# Patient Record
Sex: FEMALE | Race: WHITE | NOT HISPANIC OR LATINO | Employment: FULL TIME | URBAN - METROPOLITAN AREA
[De-identification: names, ages, dates, MRNs, and addresses within clinical notes are randomized per-mention and may not be internally consistent; named-entity substitution may affect disease eponyms.]

---

## 2017-06-26 ENCOUNTER — OFFICE VISIT (OUTPATIENT)
Dept: FAMILY MEDICINE | Facility: CLINIC | Age: 22
End: 2017-06-26

## 2017-06-26 VITALS
HEART RATE: 56 BPM | WEIGHT: 139.6 LBS | HEIGHT: 66 IN | OXYGEN SATURATION: 99 % | BODY MASS INDEX: 22.43 KG/M2 | RESPIRATION RATE: 18 BRPM | SYSTOLIC BLOOD PRESSURE: 112 MMHG | TEMPERATURE: 98.1 F | DIASTOLIC BLOOD PRESSURE: 74 MMHG

## 2017-06-26 DIAGNOSIS — Z11.3 SCREENING EXAMINATION FOR VENEREAL DISEASE: ICD-10-CM

## 2017-06-26 DIAGNOSIS — Z23 NEED FOR VACCINATION: ICD-10-CM

## 2017-06-26 DIAGNOSIS — Z00.00 ENCOUNTER FOR ROUTINE ADULT HEALTH EXAMINATION WITHOUT ABNORMAL FINDINGS: Primary | ICD-10-CM

## 2017-06-26 DIAGNOSIS — Z12.4 SCREENING FOR MALIGNANT NEOPLASM OF CERVIX: ICD-10-CM

## 2017-06-26 PROCEDURE — 90714 TD VACC NO PRESV 7 YRS+ IM: CPT | Performed by: PHYSICIAN ASSISTANT

## 2017-06-26 PROCEDURE — 90471 IMMUNIZATION ADMIN: CPT | Performed by: PHYSICIAN ASSISTANT

## 2017-06-26 PROCEDURE — 87591 N.GONORRHOEAE DNA AMP PROB: CPT | Performed by: PHYSICIAN ASSISTANT

## 2017-06-26 PROCEDURE — G0145 SCR C/V CYTO,THINLAYER,RESCR: HCPCS | Performed by: PHYSICIAN ASSISTANT

## 2017-06-26 PROCEDURE — 99395 PREV VISIT EST AGE 18-39: CPT | Mod: 25 | Performed by: PHYSICIAN ASSISTANT

## 2017-06-26 PROCEDURE — 87491 CHLMYD TRACH DNA AMP PROBE: CPT | Performed by: PHYSICIAN ASSISTANT

## 2017-06-26 NOTE — LETTER
[facility]          [address] [place] [address]                            [phone]  Fax: [phone]    Teresa Fleming  [address]   [place] [place] [facility] [address]    [id number]    June 26, 2017      To whom it may concern    Teresa Fleming is medically cleared to participate in wrestling at this time.  She was seen and evaluated in my office on 06/26/017 and her medical examination was normal.  If you have any other questions or concerns please feel free to contact me at anytime.        Sincerely,      Aaron Rodriguez PA-C

## 2017-06-26 NOTE — PROGRESS NOTES
SUBJECTIVE:     CC: Teresa Fleming is an 22 year old woman who presents for preventive health visit.     Healthy Habits:    Do you get at least three servings of calcium containing foods daily (dairy, green leafy vegetables, etc.)? yes    Amount of exercise or daily activities, outside of work: 5-7 day(s) per week    Problems taking medications regularly No    Medication side effects: No    Have you had an eye exam in the past two years? yes    Do you see a dentist twice per year? no    Do you have sleep apnea, excessive snoring or daytime drowsiness?no    Needs CPE and letter to begin wrestling training. No other concerns      Today's PHQ-2 Score:   PHQ-2 ( 1999 Pfizer) 6/26/2017   Q1: Little interest or pleasure in doing things 0   Q2: Feeling down, depressed or hopeless 0   PHQ-2 Score 0       Abuse: Current or Past(Physical, Sexual or Emotional)- No  Do you feel safe in your environment - Yes    Social History   Substance Use Topics     Smoking status: Never Smoker     Smokeless tobacco: Not on file     Alcohol use Yes     The patient does not drink >3 drinks per day nor >7 drinks per week.    No results for input(s): CHOL, HDL, LDL, TRIG, CHOLHDLRATIO, NHDL in the last 28054 hours.    Reviewed orders with patient.  Reviewed health maintenance and updated orders accordingly - Yes    Mammo Decision Support:  Mammogram not appropriate for this patient based on age.    Pertinent mammograms are reviewed under the imaging tab.  History of abnormal Pap smear: NO - age 21-29 PAP every 3 years recommended    Reviewed and updated as needed this visit by clinical staff  Tobacco  Allergies  Meds  Med Hx  Surg Hx  Fam Hx  Soc Hx        Reviewed and updated as needed this visit by Provider  Meds  Med Hx  Surg Hx  Fam Hx        History reviewed. No pertinent past medical history.   History reviewed. No pertinent surgical history.  Obstetric History     No data available          ROS:  C: NEGATIVE for fever, chills,  "change in weight  I: NEGATIVE for worrisome rashes, moles or lesions  E: NEGATIVE for vision changes or irritation  ENT: NEGATIVE for ear, mouth and throat problems  R: NEGATIVE for significant cough or SOB  B: NEGATIVE for masses, tenderness or discharge  CV: NEGATIVE for chest pain, palpitations or peripheral edema  GI: NEGATIVE for nausea, abdominal pain, heartburn, or change in bowel habits  : NEGATIVE for unusual urinary or vaginal symptoms. Periods are regular.  M: NEGATIVE for significant arthralgias or myalgia  N: NEGATIVE for weakness, dizziness or paresthesias  P: NEGATIVE for changes in mood or affect    There is no problem list on file for this patient.    History reviewed. No pertinent surgical history.    Social History   Substance Use Topics     Smoking status: Never Smoker     Smokeless tobacco: Not on file     Alcohol use Yes     Family History   Problem Relation Age of Onset     Hypertension Mother          No current outpatient prescriptions on file.     No Known Allergies  No lab results found.   OBJECTIVE:     /74 (BP Location: Left arm, Patient Position: Chair, Cuff Size: Adult Regular)  Pulse 56  Temp 98.1  F (36.7  C) (Tympanic)  Resp 18  Ht 5' 5.5\" (1.664 m)  Wt 139 lb 9.6 oz (63.3 kg)  LMP 06/20/2017  SpO2 99%  BMI 22.88 kg/m2  EXAM:  GENERAL: healthy, alert and no distress  EYES: Eyes grossly normal to inspection, PERRL and conjunctivae and sclerae normal  HENT: ear canals and TM's normal, nose and mouth without ulcers or lesions  NECK: no adenopathy, no asymmetry, masses, or scars and thyroid normal to palpation  RESP: lungs clear to auscultation - no rales, rhonchi or wheezes  BREAST: normal without masses, tenderness or nipple discharge and no palpable axillary masses or adenopathy  CV: regular rate and rhythm, normal S1 S2, no S3 or S4, no murmur, click or rub, no peripheral edema  ABDOMEN: soft, nontender, no hepatosplenomegaly, no masses and bowel sounds normal   " "(female): normal female external genitalia, normal urethral meatus, vaginal mucosa, normal cervix/adnexa/uterus without masses or discharge  MS: no gross musculoskeletal defects noted, no edema  SKIN: no suspicious lesions or rashes  NEURO: Normal strength and tone, mentation intact and speech normal  PSYCH: mentation appears normal, affect normal/bright    ASSESSMENT/PLAN:     1. Encounter for routine adult health examination without abnormal findings    2. Screening examination for venereal disease  - NEISSERIA GONORRHOEA PCR  - CHLAMYDIA TRACHOMATIS PCR    3. Screening for malignant neoplasm of cervix  - Pap imaged thin layer screen only - recommended age 21 - 24 years    4. Need for vaccination  TD given today, she will check records and return if she has not had her HPV shot, but she is unsure at this time.        COUNSELING:   Reviewed preventive health counseling, as reflected in patient instructions       Regular exercise       Healthy diet/nutrition         reports that she has never smoked. She does not have any smokeless tobacco history on file.    Estimated body mass index is 22.88 kg/(m^2) as calculated from the following:    Height as of this encounter: 5' 5.5\" (1.664 m).    Weight as of this encounter: 139 lb 9.6 oz (63.3 kg).       Counseling Resources:  ATP IV Guidelines  Pooled Cohorts Equation Calculator  Breast Cancer Risk Calculator  FRAX Risk Assessment  ICSI Preventive Guidelines  Dietary Guidelines for Americans, 2010  USDA's MyPlate  ASA Prophylaxis  Lung CA Screening    Aaron Rodriguez PA-C  [facility]  "

## 2017-06-26 NOTE — MR AVS SNAPSHOT
After Visit Summary   6/26/2017    Teresa Fleming    MRN: [id number]           Patient Information     Date Of Birth          1995        Visit Information        Provider Department      6/26/2017 10:00 AM Aaron Rodriguez PA-C [facility]        Today's Diagnoses     Encounter for routine adult health examination without abnormal findings    -  1    Screening examination for venereal disease        Screening for malignant neoplasm of cervix        Need for HPV vaccine        Need for prophylactic vaccination with tetanus-diphtheria (TD)        Need for vaccination          Care Instructions      Preventive Health Recommendations  Female Ages 18 to 25     Yearly exam:     See your health care provider every year in order to  o Review health changes.   o Discuss preventive care.    o Review your medicines if your doctor has prescribed any.      You should be tested each year for STDs (sexually transmitted diseases).       After age 20, talk to your provider about how often you should have cholesterol testing.      Starting at age 21, get a Pap test every three years. If you have an abnormal result, your doctor may have you test more often.      If you are at risk for diabetes, you should have a diabetes test (fasting glucose).     Shots:     Get a flu shot each year.     Get a tetanus shot every 10 years.     Consider getting the shot (vaccine) that prevents cervical cancer (Gardasil).    Nutrition:     Eat at least 5 servings of fruits and vegetables each day.    Eat whole-grain bread, whole-wheat pasta and brown rice instead of white grains and rice.    Talk to your provider about Calcium and Vitamin D.     Lifestyle    Exercise at least 150 minutes a week each week (30 minutes a day, 5 days a week). This will help you control your weight and prevent disease.    Limit alcohol to one drink per day.    No smoking.     Wear sunscreen to prevent skin cancer.    See your  "dentist every six months for an exam and cleaning.          Follow-ups after your visit        Who to contact     If you have questions or need follow up information about today's clinic visit or your schedule please contact [facility] [place] directly at [phone].  Normal or non-critical lab and imaging results will be communicated to you by MyChart, letter or phone within 4 business days after the clinic has received the results. If you do not hear from us within 7 days, please contact the clinic through MyChart or phone. If you have a critical or abnormal lab result, we will notify you by phone as soon as possible.  Submit refill requests through [employer] or call your pharmacy and they will forward the refill request to us. Please allow 3 business days for your refill to be completed.          Additional Information About Your Visit        MyChart Information     [employer] lets you send messages to your doctor, view your test results, renew your prescriptions, schedule appointments and more. To sign up, go to [email or web address] . Click on \"Log in\" on the left side of the screen, which will take you to the Welcome page. Then click on \"Sign up Now\" on the right side of the page.     You will be asked to enter the access code listed below, as well as some personal information. Please follow the directions to create your username and password.     Your access code is: [email or web address]  Expires: 2017 10:33 AM     Your access code will  in 90 days. If you need help or a new code, please call your [facility] clinic or [phone].        Care EveryWhere ID     This is your Care EveryWhere ID. This could be used by other organizations to access your [facility] medical records  [id number]        Your Vitals Were     Pulse Temperature Respirations Height Last Period Pulse Oximetry    56 98.1  F (36.7  C) (Tympanic) 18 5' 5.5\" (1.664 m) 2017 99%    BMI (Body Mass Index)                   22.88 " kg/m2            Blood Pressure from Last 3 Encounters:   06/26/17 112/74    Weight from Last 3 Encounters:   06/26/17 139 lb 9.6 oz (63.3 kg)              We Performed the Following     CHLAMYDIA TRACHOMATIS PCR     NEISSERIA GONORRHOEA PCR     Pap imaged thin layer screen only - recommended age 21 - 24 years        Primary Care Provider    None Specified       No primary provider on file.        Equal Access to Services     [facility]: Hadii maryana ku hadasho Somagdielali, waaxda luqadaha, qaybta kaalmada bessda, jo-ann magana . So [facility] [phone].    ATENCIÓN: Si habla español, tiene a wallace disposición servicios gratuitos de asistencia lingüística. Llame al [phone].    We comply with applicable federal civil rights laws and [place] laws. We do not discriminate on the basis of race, color, national origin, age, disability sex, sexual orientation or gender identity.            Thank you!     Thank you for choosing [facility]  for your care. Our goal is always to provide you with excellent care. Hearing back from our patients is one way we can continue to improve our services. Please take a few minutes to complete the written survey that you may receive in the mail after your visit with us. Thank you!             Your Updated Medication List - Protect others around you: Learn how to safely use, store and throw away your medicines at www.disposemymeds.org.      Notice  As of 6/26/2017 10:33 AM    You have not been prescribed any medications.

## 2017-06-26 NOTE — NURSING NOTE
"Chief Complaint   Patient presents with     Physical       Initial /74 (BP Location: Left arm, Patient Position: Chair, Cuff Size: Adult Regular)  Pulse 56  Temp 98.1  F (36.7  C) (Tympanic)  Resp 18  Ht 5' 5.5\" (1.664 m)  Wt 139 lb 9.6 oz (63.3 kg)  LMP 06/20/2017  SpO2 99%  BMI 22.88 kg/m2 Estimated body mass index is 22.88 kg/(m^2) as calculated from the following:    Height as of this encounter: 5' 5.5\" (1.664 m).    Weight as of this encounter: 139 lb 9.6 oz (63.3 kg).  Medication Reconciliation: complete    Venessa Rodarte MA  "

## 2017-06-27 LAB
C TRACH DNA SPEC QL NAA+PROBE: NORMAL
N GONORRHOEA DNA SPEC QL NAA+PROBE: NORMAL
SPECIMEN SOURCE: NORMAL
SPECIMEN SOURCE: NORMAL

## 2017-06-27 NOTE — PROGRESS NOTES
Teresa-  Here are your recent results. They are normal.  If you have any questions please do not hesitate to contact our office via phone  ([phone]) or through [employer].      -Chlamydia and gonnohrea tests are normal.    If you have any questions please do not hesitate to contact our office via phone ([phone]) or you may send me a message via [employer] by clicking the contact my Care Team link.      It was a pleasure participating in your care!    Thank you,    Aaron Rodriguez MPH, PA-C  [address] [place] [address] [phone]

## 2017-06-28 LAB
COPATH REPORT: NORMAL
PAP: NORMAL

## 2017-07-10 ENCOUNTER — OFFICE VISIT (OUTPATIENT)
Dept: FAMILY MEDICINE | Facility: CLINIC | Age: 22
End: 2017-07-10
Payer: COMMERCIAL

## 2017-07-10 VITALS
HEIGHT: 66 IN | WEIGHT: 141 LBS | DIASTOLIC BLOOD PRESSURE: 78 MMHG | TEMPERATURE: 98.6 F | HEART RATE: 88 BPM | OXYGEN SATURATION: 98 % | BODY MASS INDEX: 22.66 KG/M2 | SYSTOLIC BLOOD PRESSURE: 126 MMHG

## 2017-07-10 DIAGNOSIS — Z30.09 GENERAL COUNSELING FOR PRESCRIPTION OF ORAL CONTRACEPTIVES: Primary | ICD-10-CM

## 2017-07-10 LAB — BETA HCG QUAL IFA URINE: NEGATIVE

## 2017-07-10 PROCEDURE — 99213 OFFICE O/P EST LOW 20 MIN: CPT | Performed by: INTERNAL MEDICINE

## 2017-07-10 PROCEDURE — 84703 CHORIONIC GONADOTROPIN ASSAY: CPT | Performed by: INTERNAL MEDICINE

## 2017-07-10 RX ORDER — LEVONORGESTREL/ETHIN.ESTRADIOL 0.1-0.02MG
1 TABLET ORAL DAILY
Qty: 84 TABLET | Refills: 3 | Status: SHIPPED | OUTPATIENT
Start: 2017-07-10

## 2017-07-10 NOTE — PROGRESS NOTES
"  SUBJECTIVE:                                                    Teresa Fleming is a 22 year old female who presents to clinic today for the following health issues:    Discuss birth control options. Patient is interested in a birth control option. Reports normal periods. She is not a smoker and does not have migraines.     Teresa just moved here from [place], [place] to attend wrestling school. She is also working at a vet clinic.       Problem list and histories reviewed & adjusted, as indicated.  Additional history: as documented    There is no problem list on file for this patient.    No past surgical history on file.    Social History   Substance Use Topics     Smoking status: Never Smoker     Smokeless tobacco: Not on file     Alcohol use Yes     Family History   Problem Relation Age of Onset     Hypertension Mother      DIABETES Maternal Grandmother            Reviewed and updated as needed this visit by clinical staff  Tobacco  Allergies  Meds       Reviewed and updated as needed this visit by Provider         ROS:  Constitutional, HEENT, cardiovascular, pulmonary, GI, , musculoskeletal, neuro, skin, endocrine and psych systems are negative, except as otherwise noted.    OBJECTIVE:     /78 (BP Location: Right arm, Patient Position: Chair, Cuff Size: Adult Regular)  Pulse 88  Temp 98.6  F (37  C) (Tympanic)  Ht 5' 5.5\" (1.664 m)  Wt 141 lb (64 kg)  LMP 06/20/2017 (Exact Date)  SpO2 98%  BMI 23.11 kg/m2  Body mass index is 23.11 kg/(m^2).  GENERAL: healthy, alert and no distress  RESP: lungs clear to auscultation - no rales, rhonchi or wheezes  CV: regular rate and rhythm, normal S1 S2, no S3 or S4, no murmur, click or rub, no peripheral edema and peripheral pulses strong  PSYCH: mentation appears normal, affect normal/bright    Diagnostic Test Results:  Urine pregnancy negative     ASSESSMENT/PLAN:     1. General counseling for prescription of oral contraceptives  Discussed options for birth control " (IUD vs. Implant vs oral contraceptive). Teresa is worried that the implant might get damaged during wrestling. I agree that this probably isn't the best choice for her. She is not interested in the IUD so she would like to go with the oral contraceptive.   - levonorgestrel-ethinyl estradiol (GREGORIA OVIEDO,LESSINA) 0.1-20 MG-MCG per tablet; Take 1 tablet by mouth daily  Dispense: 84 tablet; Refill: 3  - Beta HCG qual IFA urine    Follow up as needed.      Delia Ly MD  [facility]

## 2017-07-10 NOTE — NURSING NOTE
"Chief Complaint   Patient presents with     Contraception     discuss        Initial /78 (BP Location: Right arm, Patient Position: Chair, Cuff Size: Adult Regular)  Pulse 88  Temp 98.6  F (37  C) (Tympanic)  Ht 5' 5.5\" (1.664 m)  Wt 141 lb (64 kg)  LMP 06/20/2017 (Exact Date)  SpO2 98%  BMI 23.11 kg/m2 Estimated body mass index is 23.11 kg/(m^2) as calculated from the following:    Height as of this encounter: 5' 5.5\" (1.664 m).    Weight as of this encounter: 141 lb (64 kg).  Medication Reconciliation: complete     Zara Brown      "

## 2017-07-10 NOTE — MR AVS SNAPSHOT
"              After Visit Summary   7/10/2017    Teresa Fleming    MRN: [id number]           Patient Information     Date Of Birth          1995        Visit Information        Provider Department      7/10/2017 3:40 PM Delia Ly MD [facility] [place]        Today's Diagnoses     General counseling for prescription of oral contraceptives    -  1       Follow-ups after your visit        Who to contact     If you have questions or need follow up information about today's clinic visit or your schedule please contact [facility] [place] directly at [phone].  Normal or non-critical lab and imaging results will be communicated to you by [employer], letter or phone within 4 business days after the clinic has received the results. If you do not hear from us within 7 days, please contact the clinic through [employer] or phone. If you have a critical or abnormal lab result, we will notify you by phone as soon as possible.  Submit refill requests through [email or web address] or call your pharmacy and they will forward the refill request to us. Please allow 3 business days for your refill to be completed.          Additional Information About Your Visit        MyChart Information     [email or web address] gives you secure access to your electronic health record. If you see a primary care provider, you can also send messages to your care team and make appointments. If you have questions, please call your primary care clinic.  If you do not have a primary care provider, please call [phone] and they will assist you.        Care EveryWhere ID     This is your Care EveryWhere ID. This could be used by other organizations to access your [facility] medical records  [id number]        Your Vitals Were     Pulse Temperature Height Last Period Pulse Oximetry BMI (Body Mass Index)    88 98.6  F (37  C) (Tympanic) 5' 5.5\" (1.664 m) 06/20/2017 (Exact Date) 98% 23.11 kg/m2       Blood Pressure from Last 3 Encounters:   07/10/17 " 126/78   06/26/17 112/74    Weight from Last 3 Encounters:   07/10/17 141 lb (64 kg)   06/26/17 139 lb 9.6 oz (63.3 kg)              We Performed the Following     Beta HCG qual IFA urine          Today's Medication Changes          These changes are accurate as of: 7/10/17  4:38 PM.  If you have any questions, ask your nurse or doctor.               Start taking these medicines.        Dose/Directions    levonorgestrel-ethinyl estradiol 0.1-20 MG-MCG per tablet   Commonly known as:  GREGORIA OVIEDO LESSINA   Used for:  General counseling for prescription of oral contraceptives   Started by:  Delia Ly MD        Dose:  1 tablet   Take 1 tablet by mouth daily   Quantity:  84 tablet   Refills:  3            Where to get your medicines      These medications were sent to [facility] [place] - [address]  [address] [address]     Phone:  [phone]     levonorgestrel-ethinyl estradiol 0.1-20 MG-MCG per tablet                Primary Care Provider    None Specified       No primary provider on file.        Equal Access to Services     [facility]: Hadbhavesh dorado Socarlyn, waaxda lukacey, qaybta kaalmada josé miguel, jo-ann magana . So [facility] [phone].    ATENCIÓN: Si habla español, tiene a wallace disposición servicios gratuitos de asistencia lingüística. Llame al [phone].    We comply with applicable federal civil rights laws and [place] laws. We do not discriminate on the basis of race, color, national origin, age, disability sex, sexual orientation or gender identity.            Thank you!     Thank you for choosing [facility] [place]  for your care. Our goal is always to provide you with excellent care. Hearing back from our patients is one way we can continue to improve our services. Please take a few minutes to complete the written survey that you may receive in the mail after your visit  with us. Thank you!             Your Updated Medication List - Protect others around you: Learn how to safely use, store and throw away your medicines at www.disposemymeds.org.          This list is accurate as of: 7/10/17  4:38 PM.  Always use your most recent med list.                   Brand Name Dispense Instructions for use Diagnosis    levonorgestrel-ethinyl estradiol 0.1-20 MG-MCG per tablet    AVIANE,ALESSE,LESSINA    84 tablet    Take 1 tablet by mouth daily    General counseling for prescription of oral contraceptives

## 2017-08-02 ENCOUNTER — TRANSFERRED RECORDS (OUTPATIENT)
Dept: FAMILY MEDICINE | Facility: CLINIC | Age: 22
End: 2017-08-02

## 2017-08-02 NOTE — PROGRESS NOTES
Records received from [facility] and given to Dr Ly to complete.  Keely Hyde,

## 2017-08-14 ENCOUNTER — RADIANT APPOINTMENT (OUTPATIENT)
Dept: GENERAL RADIOLOGY | Facility: CLINIC | Age: 22
End: 2017-08-14
Attending: INTERNAL MEDICINE
Payer: COMMERCIAL

## 2017-08-14 ENCOUNTER — OFFICE VISIT (OUTPATIENT)
Dept: FAMILY MEDICINE | Facility: CLINIC | Age: 22
End: 2017-08-14
Payer: COMMERCIAL

## 2017-08-14 VITALS
HEART RATE: 60 BPM | OXYGEN SATURATION: 100 % | TEMPERATURE: 98.7 F | WEIGHT: 143 LBS | SYSTOLIC BLOOD PRESSURE: 112 MMHG | DIASTOLIC BLOOD PRESSURE: 75 MMHG | BODY MASS INDEX: 23.43 KG/M2

## 2017-08-14 DIAGNOSIS — S69.92XA LEFT WRIST INJURY, INITIAL ENCOUNTER: Primary | ICD-10-CM

## 2017-08-14 DIAGNOSIS — S69.92XA LEFT WRIST INJURY, INITIAL ENCOUNTER: ICD-10-CM

## 2017-08-14 PROCEDURE — 99213 OFFICE O/P EST LOW 20 MIN: CPT | Performed by: INTERNAL MEDICINE

## 2017-08-14 PROCEDURE — 73110 X-RAY EXAM OF WRIST: CPT | Mod: LT

## 2017-08-14 NOTE — PROGRESS NOTES
SUBJECTIVE:                                                    Teresa Fleming is a 22 year old female who presents to clinic today for the following health issues:      Joint Pain    Onset:  End of July     Description:   Location: left wrist  Character: Sharp    Intensity: mild    Progression of Symptoms: worse    Accompanying Signs & Symptoms:  Other symptoms: weakness of hand     History:   Previous similar pain: no       Precipitating factors:   Trauma or overuse: YES    Alleviating factors:  Improved by: ice    Therapies Tried and outcome: ice, aleve     Woke up with soreness and stiffness of her left wrist. She iced and took some Aleve. The pain and swelling improved over the subsequent days but recently the pain has worsened again. She is attending wrestling school and wrestles 3 days per week for 2-4 hours. She also is a veterinary tech for small animals.         Problem list and histories reviewed & adjusted, as indicated.  Additional history: as documented    There is no problem list on file for this patient.    No past surgical history on file.    Social History   Substance Use Topics     Smoking status: Never Smoker     Smokeless tobacco: Not on file     Alcohol use Yes     Family History   Problem Relation Age of Onset     Hypertension Mother      DIABETES Maternal Grandmother              Reviewed and updated as needed this visit by clinical staffAllergies       Reviewed and updated as needed this visit by Provider         ROS:  Constitutional, HEENT, cardiovascular, pulmonary, gi and gu systems are negative, except as otherwise noted.      OBJECTIVE:   /75 (BP Location: Left arm, Cuff Size: Adult Regular)  Pulse 60  Temp 98.7  F (37.1  C) (Oral)  Wt 143 lb (64.9 kg)  LMP 07/17/2017  SpO2 100%  BMI 23.43 kg/m2  Body mass index is 23.43 kg/(m^2).  GENERAL: healthy, alert and no distress  RESP: lungs clear to auscultation - no rales, rhonchi or wheezes  CV: regular rate and rhythm, normal S1  S2, no S3 or S4, no murmur, click or rub, no peripheral edema and peripheral pulses strong  MS: Tenderness over left wrist along the medial aspect of the wrist, ulnar side. Pain is worse with flexion and extension. There is no swelling.   NEURO: Normal strength and tone, sensory exam grossly normal and mentation intact    Diagnostic Test Results:    X-rays were independently reviewed with the patient and interpreted by myself.   Left wrist 3 view x-ray: Negative for fracture    ASSESSMENT/PLAN:     1. Left wrist injury, initial encounter  No fracture seen on x-ray. She likely sprained her wrist during wrestling practice and the continued activity without modification has prevented her wrist from healing. I am recommending a wrist brace now to be worn 24 hours per day (even during practice). I would like her to ice her wrist after practice and she can take NSAIDS prn.   - XR Wrist Left G/E 3 Views; Future    Follow up if no improvement in symptoms      Delia Ly MD  [facility]

## 2017-08-14 NOTE — MR AVS SNAPSHOT
After Visit Summary   8/14/2017    Teresa Fleming    MRN: [id number]           Patient Information     Date Of Birth          1995        Visit Information        Provider Department      8/14/2017 9:00 AM Delia Ly MD [facility] [place]        Today's Diagnoses     Left wrist injury, initial encounter    -  1       Follow-ups after your visit        Who to contact     If you have questions or need follow up information about today's clinic visit or your schedule please contact [facility] [place] directly at [phone].  Normal or non-critical lab and imaging results will be communicated to you by SLM Technologieshart, letter or phone within 4 business days after the clinic has received the results. If you do not hear from us within 7 days, please contact the clinic through SLM Technologieshart or phone. If you have a critical or abnormal lab result, we will notify you by phone as soon as possible.  Submit refill requests through eBrevia or call your pharmacy and they will forward the refill request to us. Please allow 3 business days for your refill to be completed.          Additional Information About Your Visit        MyChart Information     eBrevia gives you secure access to your electronic health record. If you see a primary care provider, you can also send messages to your care team and make appointments. If you have questions, please call your primary care clinic.  If you do not have a primary care provider, please call [phone] and they will assist you.        Care EveryWhere ID     This is your Care EveryWhere ID. This could be used by other organizations to access your [place] medical records  [id number]        Your Vitals Were     Pulse Temperature Last Period Pulse Oximetry BMI (Body Mass Index)       60 98.7  F (37.1  C) (Oral) 07/17/2017 100% 23.43 kg/m2        Blood Pressure from Last 3 Encounters:   08/14/17 112/75   07/10/17 126/78   06/26/17 112/74    Weight from  Last 3 Encounters:   08/14/17 143 lb (64.9 kg)   07/10/17 141 lb (64 kg)   06/26/17 139 lb 9.6 oz (63.3 kg)               Primary Care Provider Office Phone # Fax #    Delia Ly [phone] [address]  [place] [address]        Equal Access to Services     [facility]: Hadii aad ku hadasho Soomaali, waaxda luqadaha, qaybta kaalmada adeegyada, waxay idiin hayaan adeeg kharash la'aan ah. So [facility] [phone].    ATENCIÓN: Si habla espantonietta, tiene a wallace disposición servicios gratuitos de asistencia lingüística. Llame al [phone].    We comply with applicable federal civil rights laws and [place] laws. We do not discriminate on the basis of race, color, national origin, age, disability sex, sexual orientation or gender identity.            Thank you!     Thank you for choosing [facility] [place]  for your care. Our goal is always to provide you with excellent care. Hearing back from our patients is one way we can continue to improve our services. Please take a few minutes to complete the written survey that you may receive in the mail after your visit with us. Thank you!             Your Updated Medication List - Protect others around you: Learn how to safely use, store and throw away your medicines at www.disposemymeds.org.          This list is accurate as of: 8/14/17  9:58 AM.  Always use your most recent med list.                   Brand Name Dispense Instructions for use Diagnosis    levonorgestrel-ethinyl estradiol 0.1-20 MG-MCG per tablet    AVIANE,ALESSE,LESSINA    84 tablet    Take 1 tablet by mouth daily    General counseling for prescription of oral contraceptives

## 2017-09-14 ENCOUNTER — OFFICE VISIT (OUTPATIENT)
Dept: OPTOMETRY | Facility: CLINIC | Age: 22
End: 2017-09-14
Payer: COMMERCIAL

## 2017-09-14 ENCOUNTER — TELEPHONE (OUTPATIENT)
Dept: OPTOMETRY | Facility: CLINIC | Age: 22
End: 2017-09-14

## 2017-09-14 DIAGNOSIS — H52.13 MYOPIA OF BOTH EYES WITH ASTIGMATISM: Primary | ICD-10-CM

## 2017-09-14 DIAGNOSIS — H52.203 MYOPIA OF BOTH EYES WITH ASTIGMATISM: Primary | ICD-10-CM

## 2017-09-14 PROCEDURE — 92310 CONTACT LENS FITTING OU: CPT | Performed by: OPTOMETRIST

## 2017-09-14 PROCEDURE — 92014 COMPRE OPH EXAM EST PT 1/>: CPT | Mod: GA | Performed by: OPTOMETRIST

## 2017-09-14 PROCEDURE — 92015 DETERMINE REFRACTIVE STATE: CPT | Performed by: OPTOMETRIST

## 2017-09-14 ASSESSMENT — VISUAL ACUITY
OD_CC: 20/30 -2
OS_SC: 20/30
CORRECTION_TYPE: GLASSES
OD_SC: 20/200
OS_SC: 20/200
OD_SC: 20/20
OS_CC: 20/40
OS_CC: 20/20
OD_CC: 20/20
METHOD: SNELLEN - LINEAR

## 2017-09-14 ASSESSMENT — CUP TO DISC RATIO
OD_RATIO: 0.5
OS_RATIO: 0.6

## 2017-09-14 ASSESSMENT — REFRACTION_CURRENTRX
OD_BRAND: ALCON AIR OPTIX AQUA BC 8.6, D 14.2
OS_DIAMETER: 13.8
OD_BRAND: ALCON AIR OPTIX NIGHT & DAY
OS_CYLINDER: -1.25
OS_AXIS: 080
OD_DIAMETER: 13.8
OD_SPHERE: -3.25
OS_BASECURVE: 8.40
OS_BRAND: ALCON AIR OPTIX NIGHT & DAY
OD_BASECURVE: 8.40
OS_SPHERE: -2.50
OS_BRAND: ALCON AIR OPTIX FOR ASTIGMATISM BC 8.7, D 14.5
OD_SPHERE: -2.50
OS_SPHERE: -2.25

## 2017-09-14 ASSESSMENT — TONOMETRY
OS_IOP_MMHG: 14
OD_IOP_MMHG: 14
IOP_METHOD: APPLANATION

## 2017-09-14 ASSESSMENT — REFRACTION_WEARINGRX
OD_AXIS: 173
OD_CYLINDER: +0.50
SPECS_TYPE: SVL
OS_SPHERE: -3.25
OS_CYLINDER: +0.75
OD_SPHERE: -3.50
OS_AXIS: 170

## 2017-09-14 ASSESSMENT — REFRACTION_MANIFEST
OS_SPHERE: -3.50
OD_AXIS: 165
OD_SPHERE: -3.50
OD_CYLINDER: +0.50
OS_CYLINDER: +1.25
OS_AXIS: 172

## 2017-09-14 ASSESSMENT — KERATOMETRY
METHOD_AUTO_MANUAL: AUTOMATED
OS_K1POWER_DIOPTERS: 43.50
OD_AXISANGLE_DEGREES: 066
OS_K2POWER_DIOPTERS: 44.00
OS_AXISANGLE_DEGREES: 070
OD_K2POWER_DIOPTERS: 43.75
OD_K1POWER_DIOPTERS: 43.50

## 2017-09-14 ASSESSMENT — CONF VISUAL FIELD
OD_NORMAL: 1
OS_NORMAL: 1

## 2017-09-14 ASSESSMENT — EXTERNAL EXAM - RIGHT EYE: OD_EXAM: NORMAL

## 2017-09-14 ASSESSMENT — SLIT LAMP EXAM - LIDS
COMMENTS: NORMAL
COMMENTS: NORMAL

## 2017-09-14 ASSESSMENT — EXTERNAL EXAM - LEFT EYE: OS_EXAM: NORMAL

## 2017-09-14 NOTE — PROGRESS NOTES
Chief Complaint   Patient presents with     COMPREHENSIVE EYE EXAM     Accompanied by self  Previous contact lens wearer? Yes:   Comfort of contact lenses : good  Satisfied with current lenses: Yes        Last Eye Exam: 2 years ago  Dilated Previously: Yes    What are you currently using to see?  glasses and contacts    Distance Vision Acuity: Satisfied with vision    Near Vision Acuity: Satisfied with vision while reading  unaided    Eye Comfort: good  Do you use eye drops? : No  Occupation or Hobbies:       Claudia Kosak, Optometric Tech     Medical, surgical and family histories reviewed and updated 9/14/2017.       OBJECTIVE: See Ophthalmology exam    ASSESSMENT:    ICD-10-CM    1. Myopia of both eyes with astigmatism H52.13 EYE EXAM (SIMPLE-NONBILLABLE)    H52.203 REFRACTION     CONTACT LENS FITTING,BILAT      PLAN:   A final glasses prescription was given.  Allow time for adaptation.  The glasses may cause dizziness and affect depth perception for awhile.  Contact lens fitting, we will order new trials in for you and will call you when they come in.  Set up an appointment so we can have you put the contacts in and assess the fit and visual acuity.  Return to clinic 1 year for Comprehensive Vision Exam      Jaycee Lucas O.D  [facility] [place]  [address] [place] [address]. [place]  [place]  [address]    [phone]

## 2017-09-14 NOTE — MR AVS SNAPSHOT
After Visit Summary   9/14/2017    Teresa Fleming    MRN: [id number]           Patient Information     Date Of Birth          1995        Visit Information        Provider Department      9/14/2017 10:40 AM Jaycee Lucas OD [facility]        Today's Diagnoses     Myopia of both eyes with astigmatism    -  1      Care Instructions        A final glasses prescription was given.  Allow time for adaptation.  The glasses may cause dizziness and affect depth perception for awhile.  Contact lens fitting, we will order new trials in for you and will call you when they come in.  Set up an appointment so we can have you put the contacts in and assess the fit and visual acuity.  Return to clinic 1 year for Comprehensive Vision Exam      Jaycee Lucas O.D  [address]. [place]  [address]    [phone]                  Follow-ups after your visit        Follow-up notes from your care team     Return in about 1 week (around 9/21/2017) for Contact Lens Trial Dispense.      Who to contact     If you have questions or need follow up information about today's clinic visit or your schedule please contact [facility] directly at [phone].  Normal or non-critical lab and imaging results will be communicated to you by MyChart, letter or phone within 4 business days after the clinic has received the results. If you do not hear from us within 7 days, please contact the clinic through MyChart or phone. If you have a critical or abnormal lab result, we will notify you by phone as soon as possible.  Submit refill requests through eSellerPro or call your pharmacy and they will forward the refill request to us. Please allow 3 business days for your refill to be completed.          Additional Information About Your Visit        MyChart Information     eSellerPro gives you secure access to your electronic health record. If you see a primary care provider,  you can also send messages to your care team and make appointments. If you have questions, please call your primary care clinic.  If you do not have a primary care provider, please call [phone] and they will assist you.        Care EveryWhere ID     This is your Care EveryWhere ID. This could be used by other organizations to access your [place] medical records  [id number]         Blood Pressure from Last 3 Encounters:   08/14/17 112/75   07/10/17 126/78   06/26/17 112/74    Weight from Last 3 Encounters:   08/14/17 64.9 kg (143 lb)   07/10/17 64 kg (141 lb)   06/26/17 63.3 kg (139 lb 9.6 oz)              We Performed the Following     CONTACT LENS FITTING,BILAT     EYE EXAM (SIMPLE-NONBILLABLE)     REFRACTION        Primary Care Provider Office Phone # Fax #    Delia Ly [phone]       [address]  [place] [address]        Equal Access to Services     [facility]: Hadii aad ku hadasho Soomaali, waaxda luqadaha, qaybta kaalmada adeegyada, waxay idiin hayaan madonnaeg kharash la'lorenzo . So [facility] [phone].    ATENCIÓN: Si habla español, tiene a wallace disposición servicios gratuitos de asistencia lingüística. Llame al [phone].    We comply with applicable federal civil rights laws and [place] laws. We do not discriminate on the basis of race, color, national origin, age, disability sex, sexual orientation or gender identity.            Thank you!     Thank you for choosing [facility] [place]  for your care. Our goal is always to provide you with excellent care. Hearing back from our patients is one way we can continue to improve our services. Please take a few minutes to complete the written survey that you may receive in the mail after your visit with us. Thank you!             Your Updated Medication List - Protect others around you: Learn how to safely use, store and throw away your medicines at www.disposemymeds.org.          This list is accurate as of:  9/14/17 11:35 AM.  Always use your most recent med list.                   Brand Name Dispense Instructions for use Diagnosis    levonorgestrel-ethinyl estradiol 0.1-20 MG-MCG per tablet    GREGORIA OVIEDO LESSINA 84 tablet    Take 1 tablet by mouth daily    General counseling for prescription of oral contraceptives

## 2017-09-14 NOTE — TELEPHONE ENCOUNTER
9/14/2017    Order trial for patient and when it comes in call patient for a dispense appt.    Left Raul Air Optix for Astigmatism BC 8.7, D 14.5     -2.25 -1.25 080     Claudia Villegas    OptBettingXpert Tech    9/20/2017    Left message for patient to call us to set up dispense appt.    Claudia Villegas    [employer]ometry Tech

## 2017-09-14 NOTE — PATIENT INSTRUCTIONS
A final glasses prescription was given.  Allow time for adaptation.  The glasses may cause dizziness and affect depth perception for awhile.  Contact lens fitting, we will order new trials in for you and will call you when they come in.  Set up an appointment so we can have you put the contacts in and assess the fit and visual acuity.  Return to clinic 1 year for Comprehensive Vision Exam      Jaycee Lucas O.D  [facility]  [address] [place] [address]. [place]  [place]  [address]    [phone]

## 2017-09-21 ENCOUNTER — OFFICE VISIT (OUTPATIENT)
Dept: OPTOMETRY | Facility: CLINIC | Age: 22
End: 2017-09-21
Payer: COMMERCIAL

## 2017-09-21 DIAGNOSIS — H52.203 MYOPIA OF BOTH EYES WITH ASTIGMATISM: Primary | ICD-10-CM

## 2017-09-21 DIAGNOSIS — H52.13 MYOPIA OF BOTH EYES WITH ASTIGMATISM: Primary | ICD-10-CM

## 2017-09-21 PROCEDURE — 92499 UNLISTED OPH SVC/PROCEDURE: CPT | Performed by: OPTOMETRIST

## 2017-09-21 PROCEDURE — 99207 ZZC NO BILLABLE SERVICE THIS VISIT: CPT | Performed by: OPTOMETRIST

## 2017-09-21 ASSESSMENT — VISUAL ACUITY
METHOD: SNELLEN - LINEAR
OD_CC: 20/20
CORRECTION_TYPE: CONTACTS
OD_CC+: -1
OS_CC: 20/40

## 2017-09-21 ASSESSMENT — REFRACTION_CURRENTRX
OS_AXIS: 080
OS_SPHERE: -2.25
OS_CYLINDER: -1.25
OD_SPHERE: -3.25
OS_BRAND: ALCON AIR OPTIX FOR ASTIGMATISM BC 8.7, D 14.5
OD_BRAND: ALCON AIR OPTIX AQUA BC 8.6, D 14.2

## 2017-09-21 NOTE — PROGRESS NOTES
Chief Complaint   Patient presents with     Contact Lens Check     dispense       Contact lenses dispensed    Claudia Villegas Optometric Tech       OBJECTIVE: See Ophthalmology exam     ASSESSMENT:    ICD-10-CM    1. Myopia of both eyes with astigmatism H52.13 CONTACT LENS CHECK    H52.203       PLAN:  Dispensed trial contact lenses, try for a couple of weeks,  then return for a contact lens check. Have contacts in at least 2 hours prior to visit      Jaycee Lucas O.D.  [address]  [address]    [phone]

## 2017-09-21 NOTE — PATIENT INSTRUCTIONS
Dispensed trial contact lenses, try for a couple of weeks,  then return for a contact lens check. Have contacts in at least 2 hours prior to visit      Jaycee Lucas O.D.  [address] [place]  [id number] [phone]

## 2017-09-21 NOTE — MR AVS SNAPSHOT
After Visit Summary   9/21/2017    Teresa Fleming    MRN: [id number]           Patient Information     Date Of Birth          1995        Visit Information        Provider Department      9/21/2017 8:40 AM Jaycee Lucas OD [facility]        Today's Diagnoses     Myopia of both eyes with astigmatism    -  1      Care Instructions         Dispensed trial contact lenses, try for a couple of weeks,  then return for a contact lens check. Have contacts in at least 2 hours prior to visit      Jaycee Lucas O.D.  [facility]  [address] [place]  [address]    [phone]              Follow-ups after your visit        Follow-up notes from your care team     Return in about 2 weeks (around 10/5/2017) for Contact Lens Check.      Who to contact     If you have questions or need follow up information about today's clinic visit or your schedule please contact [facility] directly at [phone].  Normal or non-critical lab and imaging results will be communicated to you by Deltasighthart, letter or phone within 4 business days after the clinic has received the results. If you do not hear from us within 7 days, please contact the clinic through Club Taconest or phone. If you have a critical or abnormal lab result, we will notify you by phone as soon as possible.  Submit refill requests through Rethink or call your pharmacy and they will forward the refill request to us. Please allow 3 business days for your refill to be completed.          Additional Information About Your Visit        Deltasighthart Information     Rethink gives you secure access to your electronic health record. If you see a primary care provider, you can also send messages to your care team and make appointments. If you have questions, please call your primary care clinic.  If you do not have a primary care provider, please call [phone] and they will assist you.        Care EveryWhere ID      This is your Care EveryWhere ID. This could be used by other organizations to access your [place] medical records  [id number]         Blood Pressure from Last 3 Encounters:   08/14/17 112/75   07/10/17 126/78   06/26/17 112/74    Weight from Last 3 Encounters:   08/14/17 64.9 kg (143 lb)   07/10/17 64 kg (141 lb)   06/26/17 63.3 kg (139 lb 9.6 oz)              We Performed the Following     CONTACT LENS CHECK        Primary Care Provider Office Phone # Fax #    Delia Ly [phone]       [address]  [place] [address]        Equal Access to Services     [facility] : Hadii aad ku hadasho Soomaali, waaxda luqadaha, qaybta kaalmada adeegyada, waxsena magana . So [facility] [phone].    ATENCIÓN: Si habla español, tiene a wallace disposición servicios gratuitos de asistencia lingüística. [facility] [phone].    We comply with applicable federal civil rights laws and [place] laws. We do not discriminate on the basis of race, color, national origin, age, disability sex, sexual orientation or gender identity.            Thank you!     Thank you for choosing [facility] [place]  for your care. Our goal is always to provide you with excellent care. Hearing back from our patients is one way we can continue to improve our services. Please take a few minutes to complete the written survey that you may receive in the mail after your visit with us. Thank you!             Your Updated Medication List - Protect others around you: Learn how to safely use, store and throw away your medicines at www.disposemymeds.org.          This list is accurate as of: 9/21/17  8:59 AM.  Always use your most recent med list.                   Brand Name Dispense Instructions for use Diagnosis    levonorgestrel-ethinyl estradiol 0.1-20 MG-MCG per tablet    AVIANE,ALESSE,LESSINA    84 tablet    Take 1 tablet by mouth daily    General counseling for prescription of oral  contraceptives

## 2017-10-04 ENCOUNTER — OFFICE VISIT (OUTPATIENT)
Dept: OPTOMETRY | Facility: CLINIC | Age: 22
End: 2017-10-04

## 2017-10-04 DIAGNOSIS — H52.13 MYOPIA OF BOTH EYES WITH ASTIGMATISM: Primary | ICD-10-CM

## 2017-10-04 DIAGNOSIS — H52.203 MYOPIA OF BOTH EYES WITH ASTIGMATISM: Primary | ICD-10-CM

## 2017-10-04 PROCEDURE — 92499 UNLISTED OPH SVC/PROCEDURE: CPT | Performed by: OPTOMETRIST

## 2017-10-04 PROCEDURE — 99207 ZZC NO BILLABLE SERVICE THIS VISIT: CPT | Performed by: OPTOMETRIST

## 2017-10-04 ASSESSMENT — REFRACTION_CURRENTRX
OS_CYLINDER: -1.25
OS_AXIS: 080
OD_BRAND: ALCON AIR OPTIX AQUA BC 8.6, D 14.2
OS_SPHERE: -2.25
OD_SPHERE: -3.25
OS_BRAND: ALCON AIR OPTIX FOR ASTIGMATISM BC 8.7, D 14.5

## 2017-10-04 ASSESSMENT — VISUAL ACUITY
METHOD: SNELLEN - LINEAR
OS_CC: 20/20
OD_CC+: -1
OD_CC: 20/20
CORRECTION_TYPE: CONTACTS

## 2017-10-04 ASSESSMENT — SLIT LAMP EXAM - LIDS
COMMENTS: NORMAL
COMMENTS: NORMAL

## 2017-10-04 ASSESSMENT — EXTERNAL EXAM - RIGHT EYE: OD_EXAM: NORMAL

## 2017-10-04 ASSESSMENT — EXTERNAL EXAM - LEFT EYE: OS_EXAM: NORMAL

## 2017-10-04 NOTE — MR AVS SNAPSHOT
After Visit Summary   10/4/2017    Teresa Fleming    MRN: [id number]           Patient Information     Date Of Birth          1995        Visit Information        Provider Department      10/4/2017 3:20 PM Jaycee Lucas OD [facility]        Today's Diagnoses     Myopia of both eyes with astigmatism    -  1      Care Instructions        Gave Contact Lens Prescription, okay to order contact lenses  Return to clinic as needed, or 1 year for comprehensive vision exam       Jaycee Lucas O.D.  [facility]  [address] [place]  [address]    [phone]    <  Optometry Providers       Clinic Locations                                 Telephone Number   Dr. Danica Lucas [facility] [phone]     [place] [facility] Hours:                [place] [facility] Hours:       [place] [facility] Hours:  [address]   [address] [address]     [address] [place] [address]   [place] [address]    [place] [address]  Phone: [phone]                    Phone [phone]                      Phone: [phone]                          Monday 8:00-7:00                          Monday 8:00-7:00                          Monday 8:00-7:00              Tuesday 8:00-6:00                          Tuesday 8:00-7:00                          Tuesday 8:00-7:00              Wednesday 8:00-6:00                  Wednesday 8:00-7:00                   Wednesday 8:00-7:00      Thursday 8:00-6:00                        Thursday 8:00-7:00                         Thursday 8:00-7:00            Friday 8:00-5:00                              Friday 8:00-5:00                              Friday 8:00-5:00    Please log on to [place] to order your contact lenses.  The link is found on the Eye Care and Vision Services page.  As always, Thank you for trusting us with  your health care needs!                Follow-ups after your visit        Follow-up notes from your care team     Return in about 1 year (around 10/4/2018) for Eye Exam.      Who to contact     If you have questions or need follow up information about today's clinic visit or your schedule please contact [facility] [place] directly at [phone].  Normal or non-critical lab and imaging results will be communicated to you by MyChart, letter or phone within 4 business days after the clinic has received the results. If you do not hear from us within 7 days, please contact the clinic through trakkies Researchhart or phone. If you have a critical or abnormal lab result, we will notify you by phone as soon as possible.  Submit refill requests through MindSnacks or call your pharmacy and they will forward the refill request to us. Please allow 3 business days for your refill to be completed.          Additional Information About Your Visit        trakkies Researchhart Information     MindSnacks gives you secure access to your electronic health record. If you see a primary care provider, you can also send messages to your care team and make appointments. If you have questions, please call your primary care clinic.  If you do not have a primary care provider, please call [phone] and they will assist you.        Care EveryWhere ID     This is your Care EveryWhere ID. This could be used by other organizations to access your [place] medical records  [id number]         Blood Pressure from Last 3 Encounters:   08/14/17 112/75   07/10/17 126/78   06/26/17 112/74    Weight from Last 3 Encounters:   08/14/17 64.9 kg (143 lb)   07/10/17 64 kg (141 lb)   06/26/17 63.3 kg (139 lb 9.6 oz)              We Performed the Following     CONTACT LENS CHECK        Primary Care Provider Office Phone # Fax #    Delia Ly [phone]       [address]  [place] [address]        Equal Access to Services     PHILIP GONZALEZ: Merrick  maryana Haywood, wawilmerjem craigkourtney, qaroopata kachelsy valdez, jo-ann idiin haykainmaggie eidluxsita ashleyValerialorenzo brian. So [facility] [phone].    ATENCIÓN: Si habla español, tiene a wallace disposición servicios gratuitos de asistencia lingüística. Josephame al [phone].    We comply with applicable federal civil rights laws and [place] laws. We do not discriminate on the basis of race, color, national origin, age, disability, sex, sexual orientation, or gender identity.            Thank you!     Thank you for choosing [facility]  for your care. Our goal is always to provide you with excellent care. Hearing back from our patients is one way we can continue to improve our services. Please take a few minutes to complete the written survey that you may receive in the mail after your visit with us. Thank you!             Your Updated Medication List - Protect others around you: Learn how to safely use, store and throw away your medicines at www.disposemymeds.org.          This list is accurate as of: 10/4/17  3:41 PM.  Always use your most recent med list.                   Brand Name Dispense Instructions for use Diagnosis    levonorgestrel-ethinyl estradiol 0.1-20 MG-MCG per tablet    AVIANE,ALESSE,LESSINA    84 tablet    Take 1 tablet by mouth daily    General counseling for prescription of oral contraceptives

## 2017-10-04 NOTE — PATIENT INSTRUCTIONS
Gave Contact Lens Prescription, okay to order contact lenses  Return to clinic as needed, or 1 year for comprehensive vision exam       Jaycee Lucas O.D.  [facility]  [address] [place], [place]  [address] [phone]    <  Optometry Providers       Clinic Locations                                 Telephone Number   Dr. Danica Lucas [facility] [phone]     [place] [facility] Hours:                [place] [facility] Hours:       [place] [facility] Hours:  [address]   [address] [facility]     [address] [place] [address]   [place] [address]    [place] [address]  Phone: [phone]                    Phone [phone]                      Phone: [phone]                          Monday 8:00-7:00                          Monday 8:00-7:00                          Monday 8:00-7:00              Tuesday 8:00-6:00                          Tuesday 8:00-7:00                          Tuesday 8:00-7:00              Wednesday 8:00-6:00                  Wednesday 8:00-7:00                   Wednesday 8:00-7:00      Thursday 8:00-6:00                        Thursday 8:00-7:00                         Thursday 8:00-7:00            Friday 8:00-5:00                              Friday 8:00-5:00                              Friday 8:00-5:00    Please log on to [email or web address] to order your contact lenses.  The link is found on the Eye Care and Vision Services page.  As always, Thank you for trusting us with your health care needs!

## 2017-10-04 NOTE — PROGRESS NOTES
Chief Complaint   Patient presents with     Contact Lens Check     Satisfied with contacts:  Yes    Good comfort:  Yes  Clear vision:     Yes    Claudia Villegas, Optometric Tech          Medical, surgical and family histories reviewed and updated 10/4/2017.       OBJECTIVE: See Ophthalmology exam    ASSESSMENT:    ICD-10-CM    1. Myopia of both eyes with astigmatism H52.13 CONTACT LENS CHECK    H52.203       Good centration, cover and movement of contact lenses  Good contact lens fit    PLAN:  Gave Contact Lens Prescription, okay to order contact lenses  Return to clinic as needed, or 1 year for comprehensive vision exam       Jaycee Lucas O.D.  [facility]  [address] [place] [address]  [id number] [phone]    <  Optometry Providers       Clinic Locations                                 Telephone Number   Dr. Danica Lucas [facility] [phone]     [place] [facility] Hours:                [place] [facility] Hours:       [place] [facility] Hours:  [address]   [address] [facility]     [address] [place] [address]   [place] [address]    [place] [address]  Phone: [phone]                    Phone [phone]                      Phone: [phone]                          Monday 8:00-7:00                          Monday 8:00-7:00                          Monday 8:00-7:00              Tuesday 8:00-6:00                          Tuesday 8:00-7:00                          Tuesday 8:00-7:00              Wednesday 8:00-6:00                  Wednesday 8:00-7:00                   Wednesday 8:00-7:00      Thursday 8:00-6:00                        Thursday 8:00-7:00                         Thursday 8:00-7:00            Friday 8:00-5:00                              Friday 8:00-5:00                              Friday 8:00-5:00    Please log on to [email or web address] to order  your contact lenses.  The link is found on the [facility] page.  As always, Thank you for trusting us with your health care needs!

## 2020-03-11 ENCOUNTER — HEALTH MAINTENANCE LETTER (OUTPATIENT)
Age: 25
End: 2020-03-11

## 2020-12-27 ENCOUNTER — HEALTH MAINTENANCE LETTER (OUTPATIENT)
Age: 25
End: 2020-12-27

## 2021-04-24 ENCOUNTER — HEALTH MAINTENANCE LETTER (OUTPATIENT)
Age: 26
End: 2021-04-24

## 2021-10-09 ENCOUNTER — HEALTH MAINTENANCE LETTER (OUTPATIENT)
Age: 26
End: 2021-10-09

## 2022-05-21 ENCOUNTER — HEALTH MAINTENANCE LETTER (OUTPATIENT)
Age: 27
End: 2022-05-21

## 2022-09-17 ENCOUNTER — HEALTH MAINTENANCE LETTER (OUTPATIENT)
Age: 27
End: 2022-09-17

## 2023-06-01 ENCOUNTER — OFFICE VISIT (OUTPATIENT)
Dept: URGENT CARE | Facility: URGENT CARE | Age: 28
End: 2023-06-01
Payer: OTHER MISCELLANEOUS

## 2023-06-01 VITALS
HEART RATE: 56 BPM | SYSTOLIC BLOOD PRESSURE: 124 MMHG | DIASTOLIC BLOOD PRESSURE: 78 MMHG | BODY MASS INDEX: 24.09 KG/M2 | TEMPERATURE: 98.7 F | OXYGEN SATURATION: 100 % | WEIGHT: 147 LBS | RESPIRATION RATE: 18 BRPM

## 2023-06-01 DIAGNOSIS — S61.451A CAT BITE OF RIGHT HAND, INITIAL ENCOUNTER: Primary | ICD-10-CM
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
## 2023-06-01 PROCEDURE — 99203 OFFICE O/P NEW LOW 30 MIN: CPT | Performed by: PHYSICIAN ASSISTANT

## 2023-06-01 NOTE — LETTER
REPORT OF WORK COMP    [facility] [address] [id number] [phone]      PATIENT DATA    Employee Name: Teresa Fleming      : 1995     #: [id number]    Work related injury: Yes  Employer at time of injury: [employer]  Employer contact & phone:  Employed elsewhere? No  Workers' Compensation Carrier/Managed Care Plan:      Today's date: 2023  Date of injury: 23  Date of first visit: 23    PROVIDER EVALUATION: Please fill in as needed.  Please give copy to employee for employer.    1. Diagnosis: Cat bite right hand    2. Treatment: keep wound dry and clean.  3. Medication: Augmentin  NOTE: When ordering a medication, [place] Rules require Work Comp or WC on prescriptions.    5. Return to work date: 23   ** WITH RESTRICTIONS? Yes, with work restrictions: * Keep injury site clean and dry  DURATION OF LIMITATIONS: 23      RESTRICTIONS: Unlimited unless listed.  Restrictions apply to home and leisure also.  If work restrictions is not available, the employee is totally disabled.    Any Permanent Partial Disability? 0%    Medical Examiner: Kourtney Montoya PA-C             Next appointment: 5 days if needed    CC: Employer, Managed Care Plan/Payor, Patient

## 2023-06-02 NOTE — PROGRESS NOTES
URGENT CARE VISIT:    SUBJECTIVE:   Teresa Fleming is a 28 year old female who presents with a chief complaint of an animal bite on the hand right.  She was bitten by a cat today. Cicumstances of bite: unprovoked attack.  Severity: bite with skin break.  Animal's immunizations up to date  Associated symptoms include immediate pain. Tetanus status: last tetanus booster within 10 years    PMH: History reviewed. No pertinent past medical history.  Allergies: Patient has no known allergies.   Medications:   Current Outpatient Medications   Medication Sig Dispense Refill     amoxicillin-clavulanate (AUGMENTIN) 875-125 MG tablet Take 1 tablet by mouth 2 times daily for 7 days 14 tablet 0     levonorgestrel-ethinyl estradiol (AVIANE,ALESSE,LESSINA) 0.1-20 MG-MCG per tablet Take 1 tablet by mouth daily 84 tablet 3     Social History:   Social History     Tobacco Use     Smoking status: Never     Smokeless tobacco: Never   Vaping Use     Vaping status: Not on file   Substance Use Topics     Alcohol use: Yes       ROS:  Review of systems negative except as stated above.    OBJECTIVE:  /78   Pulse 56   Temp 98.7  F (37.1  C)   Resp 18   Wt 66.7 kg (147 lb)   SpO2 100%   BMI 24.09 kg/m    GENERAL: healthy, alert no acute distress  SKIN: puncture wound of right palm  MS:extremities normal- no gross deformities noted,  FROM noted in all extremities  NEURO: Normal strength and tone, sensory exam grossly normal,  normal speech and mentation    ASSESSMENT:    ICD-10-CM    1. Cat bite of right hand, initial encounter  S61.451A amoxicillin-clavulanate (AUGMENTIN) 875-125 MG tablet    W55.01XA           PLAN:  Patient Instructions   Patient was educated on the natural course of condition. Take medication as prescribed. Side effects discussed.  Conservative measures discussed including over-the-counter analgesics (Tylenol or Ibuprofen). Observe carefully for any signs of increased redness or pain in the affected area. See  your primary care provider if symptoms worsen or do not improve in 3 days. Seek emergency care if you develop severe pain, streaking, or fever.     Patient verbalized understanding and is agreeable to plan. The patient was discharged ambulatory and in stable condition.    Kourtney Montoya PA-C ....................  6/1/2023   7:53 PM

## 2023-06-04 ENCOUNTER — HEALTH MAINTENANCE LETTER (OUTPATIENT)
Age: 28
End: 2023-06-04

## 2023-08-09 ENCOUNTER — OFFICE VISIT (OUTPATIENT)
Dept: URGENT CARE | Facility: URGENT CARE | Age: 28
End: 2023-08-09
Payer: OTHER MISCELLANEOUS

## 2023-08-09 VITALS
OXYGEN SATURATION: 100 % | TEMPERATURE: 98.1 F | WEIGHT: 145 LBS | HEART RATE: 59 BPM | DIASTOLIC BLOOD PRESSURE: 67 MMHG | SYSTOLIC BLOOD PRESSURE: 115 MMHG | BODY MASS INDEX: 23.76 KG/M2
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
## 2023-08-09 PROCEDURE — 99213 OFFICE O/P EST LOW 20 MIN: CPT | Performed by: FAMILY MEDICINE

## 2023-08-09 NOTE — LETTER
REPORT OF WORK COMP    [facility]  [address]  [facility] [address]  [phone]      PATIENT DATA    Employee Name: Teresa Fleming      : 1995     SS#: [id number]    Work related injury: Yes    Today's date: 2023  Date of injury: 23  Date of first visit: 23    PROVIDER EVALUATION: Please fill in as needed.  Please give copy to employee for employer.    1. Diagnosis: cat bite    2. Treatment: Augmentin.  3. Medication: Augmentin twice daily for 7 days  NOTE: When ordering a medication, [place] Rules require [place] Comp or WC on prescriptions.    Return to work date: 23   ** WITH RESTRICTIONS? Yes, with work restrictions: * Other: keep affected area clean and dry for the next 24 hours      RESTRICTIONS: Unlimited unless listed.  Restrictions apply to home and leisure also.  If work restrictions is not available, the employee is totally disabled.    Medical Examiner: Racheal Bryant MD          License or registration: [id number]    Next appointment: As needed    CC: Employer, Managed Care Plan/Payor, Patient

## 2023-08-09 NOTE — PROGRESS NOTES
ICD-10-CM    1. Cat bite, initial encounter  W55.01XA amoxicillin-clavulanate (AUGMENTIN) 875-125 MG tablet        Will cover with Augmentin given high rate of infection from cat bites.    PLAN:  Augmentin twice daily for 7 days.  Return for recheck if symptoms worsening despite antibiotics.  To ER if rapidly spreading redness or other signs of infection.    SUBJECTIVE:  Teresa Fleming is a 28 year old female who presents to  today after sustaining multiple cat bites on her R forearm.  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    OBJECTIVE:  /67   Pulse 59   Temp 98.1  F (36.7  C) (Tympanic)   Wt 65.8 kg (145 lb)   SpO2 100%   BMI 23.76 kg/m      GEN: well-appearing, in NAD  EXT: R forearm with 3 distinct bite punctures on dorsal and lateral forearm, minimal erythema surrounding, no red streaks, no swelling

## 2024-07-13 ENCOUNTER — HEALTH MAINTENANCE LETTER (OUTPATIENT)
Age: 29
End: 2024-07-13

## 2025-07-19 ENCOUNTER — HEALTH MAINTENANCE LETTER (OUTPATIENT)
Age: 30
End: 2025-07-19

## 2025-08-06 ENCOUNTER — OFFICE VISIT (OUTPATIENT)
Dept: URGENT CARE | Facility: URGENT CARE | Age: 30
End: 2025-08-06

## 2025-08-06 VITALS
WEIGHT: 145.2 LBS | TEMPERATURE: 98.1 F | RESPIRATION RATE: 18 BRPM | HEIGHT: 66 IN | SYSTOLIC BLOOD PRESSURE: 112 MMHG | DIASTOLIC BLOOD PRESSURE: 71 MMHG | OXYGEN SATURATION: 99 % | BODY MASS INDEX: 23.33 KG/M2 | HEART RATE: 57 BPM
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
## 2025-08-06 DIAGNOSIS — S51.851A CAT BITE OF RIGHT FOREARM, INITIAL ENCOUNTER: Primary | ICD-10-CM

## 2025-08-06 PROCEDURE — 90471 IMMUNIZATION ADMIN: CPT | Performed by: PHYSICIAN ASSISTANT

## 2025-08-06 PROCEDURE — 99213 OFFICE O/P EST LOW 20 MIN: CPT | Mod: 25 | Performed by: PHYSICIAN ASSISTANT

## 2025-08-06 PROCEDURE — 90715 TDAP VACCINE 7 YRS/> IM: CPT | Performed by: PHYSICIAN ASSISTANT

## 2025-08-06 RX ORDER — SACCHAROMYCES BOULARDII 250 MG
250 CAPSULE ORAL 2 TIMES DAILY
Qty: 28 CAPSULE | Refills: 0 | Status: SHIPPED | OUTPATIENT
Start: 2025-08-06 | End: 2025-08-20